# Patient Record
Sex: FEMALE
[De-identification: names, ages, dates, MRNs, and addresses within clinical notes are randomized per-mention and may not be internally consistent; named-entity substitution may affect disease eponyms.]

---

## 2023-06-02 ENCOUNTER — APPOINTMENT (OUTPATIENT)
Dept: PLASTIC SURGERY | Facility: CLINIC | Age: 38
End: 2023-06-02
Payer: COMMERCIAL

## 2023-06-02 PROBLEM — Z00.00 ENCOUNTER FOR PREVENTIVE HEALTH EXAMINATION: Status: ACTIVE | Noted: 2023-06-02

## 2023-06-02 PROCEDURE — 99204 OFFICE O/P NEW MOD 45 MIN: CPT

## 2023-06-04 RX ORDER — ESTRADIOL VALERATE 10 MG/ML
10 INJECTION INTRAMUSCULAR
Refills: 0 | Status: ACTIVE | COMMUNITY

## 2023-06-04 NOTE — HISTORY OF PRESENT ILLNESS
[FreeTextEntry1] : Esther is a 38-year-old transgender female patient designated male at birth with a history of gender dysphoria.  She reports that the masculine features of her face cause and exacerbate her feelings of dysphoria and result in misgendering.  She reports that she has been transitioning both socially and medically for the past 1-1/2 years.  She denies significant past medical history.  She denies past surgical history except for lipoma removal.  She denies previous gender affirming surgeries and other gender forming procedures including Botox, silicone, liposuction and fat grafting.  She denies problems with anesthesia, history of bleeding and clotting disorders.  Patient denies use of tobacco, THC, alcohol and other illicit drugs.  She is currently in transgender care at [facility] and receives mental health therapy from a private therapist.  She seeks consultation for facial feminization surgery

## 2023-07-25 ENCOUNTER — TRANSCRIPTION ENCOUNTER (OUTPATIENT)
Age: 38
End: 2023-07-25

## 2023-07-25 ENCOUNTER — OUTPATIENT (OUTPATIENT)
Dept: OUTPATIENT SERVICES | Facility: HOSPITAL | Age: 38
LOS: 1 days | End: 2023-07-25

## 2023-07-25 ENCOUNTER — APPOINTMENT (OUTPATIENT)
Dept: CT IMAGING | Facility: CLINIC | Age: 38
End: 2023-07-25
Payer: COMMERCIAL

## 2023-07-25 PROCEDURE — 70486 CT MAXILLOFACIAL W/O DYE: CPT | Mod: 26

## 2023-08-07 ENCOUNTER — NON-APPOINTMENT (OUTPATIENT)
Age: 38
End: 2023-08-07

## 2023-10-27 VITALS
RESPIRATION RATE: 16 BRPM | SYSTOLIC BLOOD PRESSURE: 128 MMHG | TEMPERATURE: 98 F | DIASTOLIC BLOOD PRESSURE: 71 MMHG | HEIGHT: 71 IN | HEART RATE: 92 BPM | WEIGHT: 168.65 LBS | OXYGEN SATURATION: 100 %

## 2023-10-29 ENCOUNTER — TRANSCRIPTION ENCOUNTER (OUTPATIENT)
Age: 38
End: 2023-10-29

## 2023-10-30 ENCOUNTER — INPATIENT (INPATIENT)
Facility: HOSPITAL | Age: 38
LOS: 0 days | Discharge: HOME | DRG: 876 | End: 2023-10-31
Attending: SURGERY | Admitting: SURGERY
Payer: COMMERCIAL

## 2023-10-30 ENCOUNTER — APPOINTMENT (OUTPATIENT)
Dept: PLASTIC SURGERY | Facility: HOSPITAL | Age: 38
End: 2023-10-30
Payer: COMMERCIAL

## 2023-10-30 DIAGNOSIS — K08.409 PARTIAL LOSS OF TEETH, UNSPECIFIED CAUSE, UNSPECIFIED CLASS: Chronic | ICD-10-CM

## 2023-10-30 DIAGNOSIS — Z98.890 OTHER SPECIFIED POSTPROCEDURAL STATES: Chronic | ICD-10-CM

## 2023-10-30 PROCEDURE — 67900 REPAIR BROW DEFECT: CPT

## 2023-10-30 PROCEDURE — 31899 UNLISTED PX TRACHEA BRONCHI: CPT

## 2023-10-30 PROCEDURE — 21122 GENIOP SLDG OSTEOT 2/>: CPT

## 2023-10-30 PROCEDURE — 21139 RDCTJ FOREHEAD CNTRG&SETBACK: CPT

## 2023-10-30 PROCEDURE — 15769 GRFG AUTOL SOFT TISS DIR EXC: CPT | Mod: 59

## 2023-10-30 PROCEDURE — 15824 RHYTIDECTOMY FOREHEAD: CPT

## 2023-10-30 PROCEDURE — 30520 REPAIR OF NASAL SEPTUM: CPT

## 2023-10-30 PROCEDURE — 15773 GRFG AUTOL FAT LIPO 25 CC/<: CPT

## 2023-10-30 PROCEDURE — 30410 RECONSTRUCTION OF NOSE: CPT

## 2023-10-30 PROCEDURE — 21193 RECONST LWR JAW W/O GRAFT: CPT

## 2023-10-30 PROCEDURE — 21172 RCNST SUPR-LAT ORB RM&LW FHD: CPT

## 2023-10-30 PROCEDURE — 21256 RECONSTRUCTION OF ORBIT: CPT

## 2023-10-30 DEVICE — SCREW CR DRV 2.0X11MM: Type: IMPLANTABLE DEVICE | Status: FUNCTIONAL

## 2023-10-30 DEVICE — GUIDE CUTTING TRANSFORM XS: Type: IMPLANTABLE DEVICE | Status: FUNCTIONAL

## 2023-10-30 DEVICE — GUIDE CUTTING W/PLAN TRANSFORM: Type: IMPLANTABLE DEVICE | Status: FUNCTIONAL

## 2023-10-30 DEVICE — IMP CUSTOM IPS TI 67MM: Type: IMPLANTABLE DEVICE | Status: FUNCTIONAL

## 2023-10-30 DEVICE — SCREW 2.0X13MM: Type: IMPLANTABLE DEVICE | Status: FUNCTIONAL

## 2023-10-30 DEVICE — SCREW EMERG CROSS DRVE TI 2X15: Type: IMPLANTABLE DEVICE | Status: FUNCTIONAL

## 2023-10-30 RX ORDER — ACETAMINOPHEN 500 MG
1000 TABLET ORAL EVERY 8 HOURS
Refills: 0 | Status: DISCONTINUED | OUTPATIENT
Start: 2023-10-30 | End: 2023-10-31

## 2023-10-30 RX ORDER — CHLORHEXIDINE GLUCONATE 213 G/1000ML
15 SOLUTION TOPICAL
Refills: 0 | Status: DISCONTINUED | OUTPATIENT
Start: 2023-10-30 | End: 2023-10-31

## 2023-10-30 RX ORDER — HYDROMORPHONE HYDROCHLORIDE 2 MG/ML
0.5 INJECTION INTRAMUSCULAR; INTRAVENOUS; SUBCUTANEOUS EVERY 4 HOURS
Refills: 0 | Status: DISCONTINUED | OUTPATIENT
Start: 2023-10-30 | End: 2023-10-31

## 2023-10-30 RX ORDER — HYDROMORPHONE HYDROCHLORIDE 2 MG/ML
1 INJECTION INTRAMUSCULAR; INTRAVENOUS; SUBCUTANEOUS
Refills: 0 | Status: DISCONTINUED | OUTPATIENT
Start: 2023-10-30 | End: 2023-10-30

## 2023-10-30 RX ORDER — HYDROMORPHONE HYDROCHLORIDE 2 MG/ML
0.5 INJECTION INTRAMUSCULAR; INTRAVENOUS; SUBCUTANEOUS
Refills: 0 | Status: DISCONTINUED | OUTPATIENT
Start: 2023-10-30 | End: 2023-10-31

## 2023-10-30 RX ORDER — CEFAZOLIN SODIUM 1 G
2000 VIAL (EA) INJECTION EVERY 8 HOURS
Refills: 0 | Status: DISCONTINUED | OUTPATIENT
Start: 2023-10-30 | End: 2023-10-30

## 2023-10-30 RX ORDER — MINOXIDIL 10 MG
2 TABLET ORAL
Refills: 0 | DISCHARGE

## 2023-10-30 RX ORDER — DEXAMETHASONE 0.5 MG/5ML
10 ELIXIR ORAL EVERY 8 HOURS
Refills: 0 | Status: DISCONTINUED | OUTPATIENT
Start: 2023-10-30 | End: 2023-10-31

## 2023-10-30 RX ORDER — OXYCODONE HYDROCHLORIDE 5 MG/1
5 TABLET ORAL EVERY 4 HOURS
Refills: 0 | Status: DISCONTINUED | OUTPATIENT
Start: 2023-10-30 | End: 2023-10-31

## 2023-10-30 RX ORDER — CALCIUM CARBONATE 500(1250)
1 TABLET ORAL EVERY 4 HOURS
Refills: 0 | Status: DISCONTINUED | OUTPATIENT
Start: 2023-10-30 | End: 2023-10-31

## 2023-10-30 RX ORDER — OXYCODONE HYDROCHLORIDE 5 MG/1
10 TABLET ORAL EVERY 4 HOURS
Refills: 0 | Status: DISCONTINUED | OUTPATIENT
Start: 2023-10-30 | End: 2023-10-31

## 2023-10-30 RX ORDER — CEFAZOLIN SODIUM 1 G
2000 VIAL (EA) INJECTION EVERY 8 HOURS
Refills: 0 | Status: DISCONTINUED | OUTPATIENT
Start: 2023-10-30 | End: 2023-10-31

## 2023-10-30 RX ORDER — DIAZEPAM 5 MG
5 TABLET ORAL EVERY 8 HOURS
Refills: 0 | Status: DISCONTINUED | OUTPATIENT
Start: 2023-10-30 | End: 2023-10-31

## 2023-10-30 RX ORDER — ONDANSETRON 8 MG/1
4 TABLET, FILM COATED ORAL EVERY 6 HOURS
Refills: 0 | Status: DISCONTINUED | OUTPATIENT
Start: 2023-10-30 | End: 2023-10-31

## 2023-10-30 RX ORDER — BUPROPION HYDROCHLORIDE 150 MG/1
1 TABLET, EXTENDED RELEASE ORAL
Refills: 0 | DISCHARGE

## 2023-10-30 RX ORDER — SODIUM CHLORIDE 9 MG/ML
500 INJECTION, SOLUTION INTRAVENOUS ONCE
Refills: 0 | Status: COMPLETED | OUTPATIENT
Start: 2023-10-30 | End: 2023-10-30

## 2023-10-30 RX ORDER — SODIUM CHLORIDE 9 MG/ML
1000 INJECTION, SOLUTION INTRAVENOUS
Refills: 0 | Status: DISCONTINUED | OUTPATIENT
Start: 2023-10-30 | End: 2023-10-31

## 2023-10-30 RX ORDER — BENZOCAINE AND MENTHOL 5; 1 G/100ML; G/100ML
1 LIQUID ORAL EVERY 4 HOURS
Refills: 0 | Status: DISCONTINUED | OUTPATIENT
Start: 2023-10-30 | End: 2023-10-31

## 2023-10-30 RX ADMIN — CHLORHEXIDINE GLUCONATE 15 MILLILITER(S): 213 SOLUTION TOPICAL at 17:12

## 2023-10-30 RX ADMIN — Medication 102 MILLIGRAM(S): at 17:36

## 2023-10-30 RX ADMIN — OXYCODONE HYDROCHLORIDE 5 MILLIGRAM(S): 5 TABLET ORAL at 17:58

## 2023-10-30 RX ADMIN — SODIUM CHLORIDE 1000 MILLILITER(S): 9 INJECTION, SOLUTION INTRAVENOUS at 15:22

## 2023-10-30 RX ADMIN — Medication 100 MILLIGRAM(S): at 16:29

## 2023-10-30 RX ADMIN — OXYCODONE HYDROCHLORIDE 5 MILLIGRAM(S): 5 TABLET ORAL at 21:22

## 2023-10-30 RX ADMIN — SODIUM CHLORIDE 75 MILLILITER(S): 9 INJECTION, SOLUTION INTRAVENOUS at 15:56

## 2023-10-30 RX ADMIN — HYDROMORPHONE HYDROCHLORIDE 0.5 MILLIGRAM(S): 2 INJECTION INTRAMUSCULAR; INTRAVENOUS; SUBCUTANEOUS at 12:18

## 2023-10-30 RX ADMIN — Medication 1000 MILLIGRAM(S): at 16:29

## 2023-10-30 RX ADMIN — OXYCODONE HYDROCHLORIDE 5 MILLIGRAM(S): 5 TABLET ORAL at 17:12

## 2023-10-30 RX ADMIN — OXYCODONE HYDROCHLORIDE 5 MILLIGRAM(S): 5 TABLET ORAL at 22:22

## 2023-10-30 RX ADMIN — Medication 400 MILLIGRAM(S): at 21:52

## 2023-10-30 RX ADMIN — Medication 400 MILLIGRAM(S): at 16:00

## 2023-10-30 RX ADMIN — HYDROMORPHONE HYDROCHLORIDE 0.5 MILLIGRAM(S): 2 INJECTION INTRAMUSCULAR; INTRAVENOUS; SUBCUTANEOUS at 12:01

## 2023-10-30 NOTE — BRIEF OPERATIVE NOTE - OPERATION/FINDINGS
FFS- forehead, lateral brow contouring, hairline advancement, genio, mandibular angles, fat grafting, lip aug, rhinoplasty, trach shave normal...

## 2023-10-30 NOTE — BRIEF OPERATIVE NOTE - NSICDXBRIEFPROCEDURE_GEN_ALL_CORE_FT
PROCEDURES:  Contouring, forehead 30-Oct-2023 11:34:25  Thom Eduardo  Genioplasty 30-Oct-2023 11:34:31  Thom Eduardo  Complete rhinoplasty 30-Oct-2023 11:34:35  Thom Eduardo

## 2023-10-31 ENCOUNTER — TRANSCRIPTION ENCOUNTER (OUTPATIENT)
Age: 38
End: 2023-10-31

## 2023-10-31 VITALS
RESPIRATION RATE: 18 BRPM | HEART RATE: 87 BPM | SYSTOLIC BLOOD PRESSURE: 120 MMHG | DIASTOLIC BLOOD PRESSURE: 74 MMHG | OXYGEN SATURATION: 100 % | TEMPERATURE: 98 F

## 2023-10-31 PROCEDURE — C1889: CPT

## 2023-10-31 PROCEDURE — C1713: CPT

## 2023-10-31 RX ORDER — CHLORHEXIDINE GLUCONATE 213 G/1000ML
15 SOLUTION TOPICAL
Qty: 0 | Refills: 0 | DISCHARGE

## 2023-10-31 RX ORDER — AMOXICILLIN AND CLAVULANATE POTASSIUM 500; 125 MG/1; MG/1
500-125 TABLET, FILM COATED ORAL TWICE DAILY
Qty: 10 | Refills: 0 | Status: ACTIVE | COMMUNITY
Start: 2023-10-31 | End: 1900-01-01

## 2023-10-31 RX ORDER — OXYCODONE HYDROCHLORIDE 5 MG/1
1 TABLET ORAL
Qty: 0 | Refills: 0 | DISCHARGE

## 2023-10-31 RX ORDER — CHLORHEXIDINE GLUCONATE, 0.12% ORAL RINSE 1.2 MG/ML
0.12 SOLUTION DENTAL
Qty: 150 | Refills: 4 | Status: ACTIVE | COMMUNITY
Start: 2023-10-31 | End: 1900-01-01

## 2023-10-31 RX ORDER — METHYLPREDNISOLONE 4 MG/1
4 TABLET ORAL
Qty: 1 | Refills: 0 | Status: ACTIVE | COMMUNITY
Start: 2023-10-31 | End: 1900-01-01

## 2023-10-31 RX ORDER — OXYCODONE AND ACETAMINOPHEN 5; 325 MG/1; MG/1
5-325 TABLET ORAL
Qty: 15 | Refills: 0 | Status: ACTIVE | COMMUNITY
Start: 2023-10-31 | End: 1900-01-01

## 2023-10-31 RX ADMIN — OXYCODONE HYDROCHLORIDE 5 MILLIGRAM(S): 5 TABLET ORAL at 02:36

## 2023-10-31 RX ADMIN — Medication 102 MILLIGRAM(S): at 00:00

## 2023-10-31 RX ADMIN — Medication 102 MILLIGRAM(S): at 09:06

## 2023-10-31 RX ADMIN — CHLORHEXIDINE GLUCONATE 15 MILLILITER(S): 213 SOLUTION TOPICAL at 05:48

## 2023-10-31 RX ADMIN — Medication 100 MILLIGRAM(S): at 08:00

## 2023-10-31 RX ADMIN — Medication 100 MILLIGRAM(S): at 00:21

## 2023-10-31 RX ADMIN — OXYCODONE HYDROCHLORIDE 5 MILLIGRAM(S): 5 TABLET ORAL at 03:36

## 2023-10-31 RX ADMIN — Medication 400 MILLIGRAM(S): at 05:47

## 2023-10-31 NOTE — PROGRESS NOTE ADULT - SUBJECTIVE AND OBJECTIVE BOX
SUBJECTIVE: Patient seen and examined at bedside this am by plastic surgery team. Patient is lying comfortably in bed with no complaints. Tolerating diet, pain well controlled by current regimen. Denies fever, nausea, vomiting, chest pain, and shortness of breath.    ceFAZolin   IVPB 2000 milliGRAM(s) IV Intermittent every 8 hours    MEDICATIONS  (PRN):  benzocaine/menthol Lozenge 1 Lozenge Oral every 4 hours PRN Sore Throat  calcium carbonate    500 mG (Tums) Chewable 1 Tablet(s) Chew every 4 hours PRN Dyspepsia  diazepam    Tablet 5 milliGRAM(s) Oral every 8 hours PRN Anxiety  HYDROmorphone  Injectable 0.5 milliGRAM(s) IV Push every 15 minutes PRN Severe Pain (7 - 10)  HYDROmorphone  Injectable 0.5 milliGRAM(s) IV Push every 4 hours PRN Severe Pain (7 - 10)  ondansetron Injectable 4 milliGRAM(s) IV Push every 6 hours PRN Nausea and/or Vomiting  oxyCODONE    IR 5 milliGRAM(s) Oral every 4 hours PRN Moderate Pain (4 - 6)  oxyCODONE    IR 10 milliGRAM(s) Oral every 4 hours PRN Severe Pain (7 - 10)      I&O's Detail    30 Oct 2023 07:01  -  31 Oct 2023 07:00  --------------------------------------------------------  IN:    IV PiggyBack: 200 mL    IV PiggyBack: 50 mL    IV PiggyBack: 50 mL    IV PiggyBack: 200 mL    Lactated Ringers: 750 mL    Lactated Ringers Bolus: 500 mL  Total IN: 1750 mL    OUT:    Bulb (mL): 30 mL    Voided (mL): 1650 mL  Total OUT: 1680 mL    Total NET: 70 mL          T(C): 36.7 (10-31-23 @ 05:00), Max: 36.9 (10-30-23 @ 16:15)  HR: 81 (10-31-23 @ 05:00) (66 - 101)  BP: 128/81 (10-31-23 @ 05:00) (88/50 - 128/81)  RR: 18 (10-31-23 @ 05:00) (16 - 23)  SpO2: 98% (10-31-23 @ 05:00) (94% - 100%)    GENERAL: NAD, Resting comfortably in bed  CONSTITUTIONAL: Alert and oriented  HEENT: Expected facial edema, incisions c/d/i  RESP: Nonlabored breathing, no respiratory distress  CV: Regular rate and rhythm   ABD: Soft, ND, NT, no guarding, no rebound tenderness  EXTREM: WWP, no edema,   DRAINS: ERIKA X 1, ss      LABS:                RADIOLOGY & ADDITIONAL STUDIES:

## 2023-10-31 NOTE — DISCHARGE NOTE PROVIDER - NSDCMRMEDTOKEN_GEN_ALL_CORE_FT
Augmentin 500 mg-125 mg oral tablet: 1 tab(s) orally every 12 hours  estradiol 0.05 mg/24 hours weekly transdermal film, extended release: 1 patch transdermally  Medrol Dosepak 4 mg oral tablet: 1 orally once a day take medrol dose katiuska as directed  minoxidil 2.5 mg oral tablet: 2 tab(s) orally once a day  multivitamin:   oxyCODONE 5 mg oral tablet: 1 tab(s) orally every 6 hours as needed for  severe pain  Peridex 0.12% mucous membrane liquid: 15 milliliter(s) orally 3 times a day swish and spit  Wellbutrin  mg/12 hours oral tablet, extended release: 1 tab(s) orally once a day

## 2023-10-31 NOTE — PROGRESS NOTE ADULT - ASSESSMENT
The patient is a 39 yo patient with a PMHx of gender dysphoria POD1 FFS - forehead, lateral brow, genioplasty, mandibular angles, tracheal shave, fat grafting, rhino.    Plan:  - d/c IVF  - c/w liquid diet  - HOB elevation  - peridex TID after meals  - c/w Ancef until discharge  - decadron 10mg IVPB q12H until discharge  - ofirmev q8h standing  - gabapentin 300mg q12h standing  - valium 5mg q8h prn  - oxycodone 5/10 q4h for mod to severe pain  - zofran 4mg IV q6h prn for nausea  - cepacol q4h PRN for sore throat  - ice packs  - encourage ambulation  - dispo: [facility]   The patient is a 37 yo patient with a PMHx of gender dysphoria POD1 FFS - forehead, lateral brow, genioplasty, mandibular angles, tracheal shave, fat grafting, rhino.    Plan:  - d/c IVF, d/c ERIKA  - dressing changed  - c/w liquid diet  - HOB elevation  - peridex TID after meals  - c/w Ancef until discharge  - decadron 10mg IVPB q12H until discharge  - ofirmev q8h standing  - gabapentin 300mg q12h standing  - valium 5mg q8h prn  - oxycodone 5/10 q4h for mod to severe pain  - zofran 4mg IV q6h prn for nausea  - cepacol q4h PRN for sore throat  - ice packs  - encourage ambulation  - dispo: [facility]

## 2023-10-31 NOTE — DISCHARGE NOTE NURSING/CASE MANAGEMENT/SOCIAL WORK - PATIENT PORTAL LINK FT
You can access the FollowMyHealth Patient Portal offered by [facility] by registering at the following website: [email or web address]. By joining [facility]’s FollowMyHealth portal, you will also be able to view your health information using other applications (apps) compatible with our system.

## 2023-10-31 NOTE — DISCHARGE NOTE PROVIDER - NSDCFUADDINST_GEN_ALL_CORE_FT
Please follow up with Dr. Rizzo within x1 week after discharge from the hospital. You may call [phone] to schedule an appointment.     Medications:  •A prescription for pain medication will be e-prescribed to your pharmacy.  Please take this as needed for severe pain.  •Do not drive while taking the prescribed pain medication.  •Do not take pain medication on an empty stomach, this may make you nauseous.  •Constipation is not uncommon when taking prescription pain medication.  You may take an OTC stool softener like Dulcolax or Sennakot to help with this.  •You may take over the counter pain medication such as Tylenol (acetaminophen) or Advil (ibuprofen) for pain.  •Please use Peridex rinse- swish and spit twice daily for 7 days.  Activity:  •No bending or heavy lifting.  Keep your head above the level of your heart.  At your first post-op visit we will assess how you are doing and will adjust the restrictions as needed.  •Sleep elevated on at least 2 pillows  Diet:  •	Intraoral incisions:  Clear liquid diet for first 48 hours then may advance to full liquid diet for 7 days.  Then advance to soft diet.  Avoid overly hot, cold, spicy, or acidic foods.  •	Do not drink alcohol in the immediate postoperative period and while taking prescription pain medication.  Wound Care:  • Gently brush teeth and keep mouth clean after eating.  At your first post-op visit we will assess the wound and instruct you of any care needed.  •You may shower.  Let soap and water run over the incisions and pat dry.  •You may gently apply ice pack, or frozen peas to the nose and eye regions.  Apply this for the first 48-72 hours.  20 minutes on, 20 minutes off.  •If you were given a facial/chin garment, please wear this 24/7 until your first postop visit

## 2023-10-31 NOTE — DISCHARGE NOTE NURSING/CASE MANAGEMENT/SOCIAL WORK - NSDCPEFALRISK_GEN_ALL_CORE
For information on Fall & Injury Prevention, visit: [email or web address] OR  [email or web address] OR  https://www.cdc.gov/steadi/patient.html

## 2023-10-31 NOTE — DISCHARGE NOTE PROVIDER - CARE PROVIDER_API CALL
Jomar Rizzo  Plastic Surgery  [address], [address]  [place] [address]  Phone: [phone]  Fax: [phone]  Follow Up Time:

## 2023-10-31 NOTE — DISCHARGE NOTE PROVIDER - HOSPITAL COURSE
Pt is a 39 y/o female, assigned male at birth.  She underwent facial feminization surgery with Dr. Rizzo including frontal sinus setback, lateral orbital rim contouring, brow lift/forehead reduction, rhinoplasty, mandibular angle reduction, osseous genioplasty, fat grafting, and tracheal shave.  She tolerated the procedure well and recovered without issue in the recovery room.  POD1, she tolerated clear liquid diet, and ambulated.  The dressings were removed and replaced, the drain was removed, and she was cleared for discharge.

## 2023-11-01 PROBLEM — Z86.59 PERSONAL HISTORY OF OTHER MENTAL AND BEHAVIORAL DISORDERS: Chronic | Status: ACTIVE | Noted: 2023-10-27

## 2023-11-04 DIAGNOSIS — F64.9 GENDER IDENTITY DISORDER, UNSPECIFIED: ICD-10-CM

## 2023-11-04 DIAGNOSIS — F90.9 ATTENTION-DEFICIT HYPERACTIVITY DISORDER, UNSPECIFIED TYPE: ICD-10-CM

## 2023-11-08 ENCOUNTER — APPOINTMENT (OUTPATIENT)
Dept: PLASTIC SURGERY | Facility: CLINIC | Age: 38
End: 2023-11-08
Payer: COMMERCIAL

## 2023-11-08 PROCEDURE — 99024 POSTOP FOLLOW-UP VISIT: CPT

## 2023-11-20 PROBLEM — F90.9 ATTENTION-DEFICIT HYPERACTIVITY DISORDER, UNSPECIFIED TYPE: Chronic | Status: ACTIVE | Noted: 2023-10-27

## 2023-12-06 ENCOUNTER — APPOINTMENT (OUTPATIENT)
Dept: PLASTIC SURGERY | Facility: CLINIC | Age: 38
End: 2023-12-06
Payer: COMMERCIAL

## 2023-12-06 DIAGNOSIS — F64.9 GENDER IDENTITY DISORDER, UNSPECIFIED: ICD-10-CM

## 2023-12-06 PROCEDURE — 99024 POSTOP FOLLOW-UP VISIT: CPT

## 2024-10-15 ENCOUNTER — NON-APPOINTMENT (OUTPATIENT)
Age: 39
End: 2024-10-15

## (undated) DEVICE — DRAIN BLAKE 10FR ROUND

## (undated) DEVICE — AESCULAP SCALPFIX 10 CLIPS

## (undated) DEVICE — Device

## (undated) DEVICE — SAW BLADE STRYKER RECIPROCATING 27MMX0.38MM

## (undated) DEVICE — DRAIN RESERVOIR FOR JACKSON PRATT 100CC CARDINAL

## (undated) DEVICE — SUCTION YANKAUER VITAL VUE

## (undated) DEVICE — SYR LUER LOK 30CC

## (undated) DEVICE — VENODYNE/SCD SLEEVE CALF MEDIUM

## (undated) DEVICE — WARMING BLANKET LOWER ADULT

## (undated) DEVICE — SYR LUER LOK 3CC

## (undated) DEVICE — SUT MONOCRYL 4-0 18" P-3 UNDYED

## (undated) DEVICE — RUBBERBAND STRL LTX FR 200/CA 3X1/8IN

## (undated) DEVICE — DRSG KERLIX ROLL 4.5"

## (undated) DEVICE — SUT SILK 2-0 30" PSL

## (undated) DEVICE — NDL HYPO SAFE 25G X 1.5" (ORANGE)

## (undated) DEVICE — PACK UPPER BODY

## (undated) DEVICE — BLADE SURGICAL #15 CARBON

## (undated) DEVICE — TWIST DRILL 1.5MM DIA X 50MM W/NOTCH SGL USE ONLY

## (undated) DEVICE — DRAPE TOWEL BLUE 17" X 24"

## (undated) DEVICE — WOUND IRR SURGIPHOR

## (undated) DEVICE — SUT MONOCRYL 5-0 18" P-3 UNDYED

## (undated) DEVICE — SYR LUER LOK 1CC

## (undated) DEVICE — SYR LUER LOK 5CC

## (undated) DEVICE — DRSG CURITY GAUZE SPONGE 4 X 4" 12-PLY

## (undated) DEVICE — ELCTR COLORADO 3CM

## (undated) DEVICE — SAW BLADE OSTEOMED VRO 12MM

## (undated) DEVICE — DRAIN JACKSON PRATT 7MM FLAT FULL NO TROCAR

## (undated) DEVICE — DRAPE IRRIGATION POUCH 19X23"

## (undated) DEVICE — SUCTION YANKAUER BULBOUS TIP NO VENT

## (undated) DEVICE — COMPRESSION CHIN-NECK SMALL

## (undated) DEVICE — SUT PLAIN GUT FAST ABSORBING 5-0 PC-1

## (undated) DEVICE — DRSG TELFA 3 X 8

## (undated) DEVICE — DRSG GAUZE MOISTURIZER 0.5 OZ 4X8

## (undated) DEVICE — BUR STRYKER CARBIDE CROSS CUT FISSURE 1.2MM

## (undated) DEVICE — CATH NG SALEM SUMP 16FR

## (undated) DEVICE — BIPOLAR FORCEP KIRWAN JEWELERS STR 4" X 0.4MM W 12FT CORD (GREEN)

## (undated) DEVICE — NDL SPINAL 22G X 3.5" (BLACK)

## (undated) DEVICE — FOLEY TRAY 16FR 5CC LF UMETER CLOSED

## (undated) DEVICE — DRAPE MAGNETIC INSTRUMENT MEDIUM

## (undated) DEVICE — DRAPE INSTRUMENT POUCH 6.75" X 11"

## (undated) DEVICE — SUT VICRYL 2-0 27" CT-2 UNDYED

## (undated) DEVICE — SYR LUER LOK 10CC

## (undated) DEVICE — BLADE SURGICAL #11 CARBON

## (undated) DEVICE — SUT VICRYL 3-0 27" SH

## (undated) DEVICE — DRSG AQUAPLAST BLANK BLUSH 3 X 3"

## (undated) DEVICE — TAPE SILK 3"

## (undated) DEVICE — MODEL CRANIUM CRYSTAL IPS

## (undated) DEVICE — LAP PAD 4 X 18"

## (undated) DEVICE — DRSG XEROFORM 1 X 8"

## (undated) DEVICE — BLADE SURGICAL #10 CARBON

## (undated) DEVICE — SYR LUER LOK 20CC

## (undated) DEVICE — MARKING PEN W RULER

## (undated) DEVICE — BLOCK SILICON

## (undated) DEVICE — SYR LUER LOK 50CC

## (undated) DEVICE — VAGINAL PACKING 2"

## (undated) DEVICE — SUT VICRYL 2-0 27" SH UNDYED

## (undated) DEVICE — MODEL IPS TRANSFORM

## (undated) DEVICE — SUT PLAIN GUT 4-0 18" PS-2

## (undated) DEVICE — BLADE INTRAORAL 11.5X0.38X12.0MM

## (undated) DEVICE — DRSG MASTISOL

## (undated) DEVICE — DRSG TUBE SPANDAGE 8 10YD

## (undated) DEVICE — NDL 18G BLUNT FILL PINK

## (undated) DEVICE — SUT CHROMIC 3-0 27" PS-2

## (undated) DEVICE — BUR STRYKER EGG 4MM

## (undated) DEVICE — DRSG STERISTRIPS 0.5 X 4"

## (undated) DEVICE — CATH ANGIOCATH 12G

## (undated) DEVICE — SUT VICRYL 6-0 18" P-3 UNDYED

## (undated) DEVICE — SUT MONOCRYL PLUS 4-0 18" PS-2 UNDYED

## (undated) DEVICE — APPLICATOR Q TIP 6" WOOD STEM

## (undated) DEVICE — RASP STRYKER LARGE TEAR CROSSCUT 14X7MM DISP